# Patient Record
Sex: MALE | Race: OTHER | NOT HISPANIC OR LATINO | URBAN - METROPOLITAN AREA
[De-identification: names, ages, dates, MRNs, and addresses within clinical notes are randomized per-mention and may not be internally consistent; named-entity substitution may affect disease eponyms.]

---

## 2018-02-17 ENCOUNTER — EMERGENCY (EMERGENCY)
Facility: HOSPITAL | Age: 78
LOS: 1 days | Discharge: HOME | End: 2018-02-17
Admitting: EMERGENCY MEDICINE
Payer: COMMERCIAL

## 2018-02-17 VITALS
OXYGEN SATURATION: 98 % | SYSTOLIC BLOOD PRESSURE: 121 MMHG | HEART RATE: 71 BPM | TEMPERATURE: 99 F | DIASTOLIC BLOOD PRESSURE: 76 MMHG | RESPIRATION RATE: 19 BRPM
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
## 2018-02-17 DIAGNOSIS — S00.81XA ABRASION OF OTHER PART OF HEAD, INITIAL ENCOUNTER: ICD-10-CM

## 2018-02-17 DIAGNOSIS — F10.120 ALCOHOL ABUSE WITH INTOXICATION, UNCOMPLICATED: ICD-10-CM
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
## 2018-02-17 DIAGNOSIS — R41.82 ALTERED MENTAL STATUS, UNSPECIFIED: ICD-10-CM

## 2018-02-17 DIAGNOSIS — F17.200 NICOTINE DEPENDENCE, UNSPECIFIED, UNCOMPLICATED: ICD-10-CM
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
## 2018-02-17 DIAGNOSIS — E78.5 HYPERLIPIDEMIA, UNSPECIFIED: ICD-10-CM

## 2018-02-17 DIAGNOSIS — I10 ESSENTIAL (PRIMARY) HYPERTENSION: ICD-10-CM

## 2018-02-17 PROCEDURE — 70450 CT HEAD/BRAIN W/O DYE: CPT | Mod: 26

## 2018-02-17 PROCEDURE — 99284 EMERGENCY DEPT VISIT MOD MDM: CPT | Mod: 25

## 2018-02-17 NOTE — ED ADULT NURSE REASSESSMENT NOTE - NS ED NURSE REASSESS COMMENT FT1
Pt is arousible to verbal name being called denies N/V/HA/ dizziness . Resp even unlarbored . Calm and coop[erative

## 2018-02-17 NOTE — ED ADULT NURSE NOTE - OBJECTIVE STATEMENT
Pt BIB ems for altered mental status  . states he was drinking " shots and Beer" fell down the stairs and hit his head

## 2018-02-17 NOTE — ED ADULT TRIAGE NOTE - CHIEF COMPLAINT QUOTE
Pt BIBA for alcohol intoxication & mechanical fall. + abrasion to top of head, not on AC's, denies LOC.  A + O x 3, PERRLA

## 2018-02-17 NOTE — ED PROVIDER NOTE - OBJECTIVE STATEMENT
76 y/o M    PMHx: Alcohol abuse, depression, htn, hld    BIB EMS for ETOH intoxication, admits to ETOH today, no other complaints, unable to cooperate with remainder of history. Pt has abrasion/ injury to top of head. States he is unsure of how he got it- morley snot know tetanus status

## 2018-02-17 NOTE — ED PROVIDER NOTE - PHYSICAL EXAMINATION
General: lethargic, arousable to sound, smells of alcohol  Head: NCAT. no bleeding from ears. no bruising to face. + abrasion and edema to top of head w/ dried blood.   Eyes: PERRL  Heart: RRR  Lungs: CTAB  Abd: soft, NTND  Neuro: moves all 4 extremities equally

## 2019-11-22 NOTE — ED PROVIDER NOTE - PROGRESS NOTE DETAILS
negative Patient AAOx3, clear speech, steady gait, appropriate for discharge, counseled extensively on alcohol use and abuse, does not want detox at this time   head CT normal. pt tolerated PO.
